# Patient Record
Sex: FEMALE | Race: WHITE | NOT HISPANIC OR LATINO | Employment: OTHER | URBAN - METROPOLITAN AREA
[De-identification: names, ages, dates, MRNs, and addresses within clinical notes are randomized per-mention and may not be internally consistent; named-entity substitution may affect disease eponyms.]

---

## 2017-10-25 ENCOUNTER — ESTABLISHED COMPREHENSIVE EXAM (OUTPATIENT)
Dept: URBAN - METROPOLITAN AREA CLINIC 43 | Facility: CLINIC | Age: 72
End: 2017-10-25

## 2017-10-25 DIAGNOSIS — H00.026: ICD-10-CM

## 2017-10-25 DIAGNOSIS — H04.123: ICD-10-CM

## 2017-10-25 DIAGNOSIS — H00.023: ICD-10-CM

## 2017-10-25 DIAGNOSIS — H35.371: ICD-10-CM

## 2017-10-25 PROCEDURE — G8785 BP SCRN NO PERF AT INTERVAL: HCPCS

## 2017-10-25 PROCEDURE — 1036F TOBACCO NON-USER: CPT

## 2017-10-25 PROCEDURE — G8427 DOCREV CUR MEDS BY ELIG CLIN: HCPCS

## 2017-10-25 PROCEDURE — 92015 DETERMINE REFRACTIVE STATE: CPT

## 2017-10-25 PROCEDURE — 92014 COMPRE OPH EXAM EST PT 1/>: CPT

## 2017-10-25 ASSESSMENT — VISUAL ACUITY
OD_SC: J2
OS_SC: J1
OS_SC: 20/25-2
OD_SC: 20/30-1

## 2017-10-25 ASSESSMENT — TONOMETRY
OD_IOP_MMHG: 16
OS_IOP_MMHG: 17

## 2018-10-24 ENCOUNTER — ESTABLISHED COMPREHENSIVE EXAM (OUTPATIENT)
Dept: URBAN - METROPOLITAN AREA CLINIC 43 | Facility: CLINIC | Age: 73
End: 2018-10-24

## 2018-10-24 DIAGNOSIS — H02.884: ICD-10-CM

## 2018-10-24 DIAGNOSIS — H35.371: ICD-10-CM

## 2018-10-24 DIAGNOSIS — H04.123: ICD-10-CM

## 2018-10-24 DIAGNOSIS — Z96.1: ICD-10-CM

## 2018-10-24 DIAGNOSIS — H02.881: ICD-10-CM

## 2018-10-24 PROCEDURE — G9903 PT SCRN TBCO ID AS NON USER: HCPCS

## 2018-10-24 PROCEDURE — G8785 BP SCRN NO PERF AT INTERVAL: HCPCS

## 2018-10-24 PROCEDURE — G8427 DOCREV CUR MEDS BY ELIG CLIN: HCPCS

## 2018-10-24 PROCEDURE — 92014 COMPRE OPH EXAM EST PT 1/>: CPT

## 2018-10-24 PROCEDURE — 1036F TOBACCO NON-USER: CPT

## 2018-10-24 PROCEDURE — 92015 DETERMINE REFRACTIVE STATE: CPT

## 2018-10-24 ASSESSMENT — TONOMETRY
OD_IOP_MMHG: 15
OS_IOP_MMHG: 16

## 2018-10-24 ASSESSMENT — VISUAL ACUITY
OD_SC: 20/30+2
OS_SC: J1
OD_SC: J1
OS_SC: 20/40+2

## 2019-07-24 ENCOUNTER — EST. PATIENT EMERGENCY (OUTPATIENT)
Dept: URBAN - METROPOLITAN AREA CLINIC 43 | Facility: CLINIC | Age: 74
End: 2019-07-24

## 2019-07-24 DIAGNOSIS — H02.884: ICD-10-CM

## 2019-07-24 DIAGNOSIS — H02.881: ICD-10-CM

## 2019-07-24 PROCEDURE — 92012 INTRM OPH EXAM EST PATIENT: CPT

## 2019-07-24 RX ORDER — LOTEPREDNOL ETABONATE AND TOBRAMYCIN 5; 3 MG/ML; MG/ML: 1 SUSPENSION/ DROPS OPHTHALMIC

## 2019-07-24 ASSESSMENT — VISUAL ACUITY
OD_SC: 20/30-1
OS_SC: 20/30+2

## 2019-07-24 ASSESSMENT — TONOMETRY
OD_IOP_MMHG: 16
OS_IOP_MMHG: 16

## 2019-10-25 ENCOUNTER — ESTABLISHED COMPREHENSIVE EXAM (OUTPATIENT)
Dept: URBAN - METROPOLITAN AREA CLINIC 43 | Facility: CLINIC | Age: 74
End: 2019-10-25

## 2019-10-25 DIAGNOSIS — H35.371: ICD-10-CM

## 2019-10-25 DIAGNOSIS — H04.123: ICD-10-CM

## 2019-10-25 PROCEDURE — 92134 CPTRZ OPH DX IMG PST SGM RTA: CPT

## 2019-10-25 PROCEDURE — 92014 COMPRE OPH EXAM EST PT 1/>: CPT

## 2019-10-25 PROCEDURE — 92015 DETERMINE REFRACTIVE STATE: CPT

## 2019-10-25 ASSESSMENT — VISUAL ACUITY
OD_SC: J1
OS_SC: J1
OD_SC: 20/30
OS_SC: 20/30-1

## 2019-10-25 ASSESSMENT — TONOMETRY
OS_IOP_MMHG: 16
OD_IOP_MMHG: 16

## 2020-03-29 NOTE — PATIENT DISCUSSION
[facility]   Daily Progress Note    Admit Date:  3/20/2020  HPI:   Presented with hypotension and weight gain. Found to have SHAUNNA on CKD as well as anasarca. Subjective:  Ms. Evelio Ramsay still with edema, weight down. No lightheadedness or cramping. Objective:   /77   Pulse 87   Temp 97.8 °F (36.6 °C) (Oral)   Resp 18   Ht 5' 4.5\" (1.638 m)   Wt 262 lb 14.4 oz (119.3 kg)   LMP 10/24/2012   SpO2 97%   BMI 44.43 kg/m²       Intake/Output Summary (Last 24 hours) at 3/29/2020 0950  Last data filed at 3/29/2020 0852  Gross per 24 hour   Intake 900 ml   Output 3700 ml   Net -2800 ml     Wt Readings from Last 3 Encounters:   03/29/20 262 lb 14.4 oz (119.3 kg)   03/20/20 269 lb (122 kg)   03/17/20 257 lb (116.6 kg)           ASSESSMENT:   1. Hypotension - improved on midodrine 10 mg tid  2. CHF, acute on chronic systolic - still volume up with anasarca, weight down 5 lbs overnight/ 14 lbs total , 3700 ml UOP/ 24 hrs, net neg 20 liters  3. Cardiomyopathy, ischemic - EF 25%, no ACEi/ ARB/ ARNI or BB due to hypotension and SHAUNNA/ CKD  4. CAD - s/p prior PCI's then CABG Jan 2020  5. Acute kidney injury on chronic kidney disease - stable with diuresis  6. Hypokalemia - 3.3, continue replacement  7. Mitral regurg  8. Sternal wound infection - augmentin per CT surgery  9. CLINT  10. DM      PLAN:  1. Continue Lasix infusion at 5 mg/hr  2. Monitor K and replace   3. Continue midodrine 10 mg tid  4. Likely here at least another 2 days.        TRAY Curry - CNP, 3/29/2020, 9:50 AM  [facility]   [phone]       Telemetry: SR 80-90  NYHA: IV    Physical Exam:  General:  Awake, alert, NAD  Skin:  Warm and dry  Neck:  JVP just below jaw line upright  Chest:  Clear to auscultation   Cardiovascular:  RRR, normal S1S2, no m/g/r  Abdomen:  Semifirm, + pitting lower and lateral abdomen, nontender, +bowel sounds  Extremities:  2+ BLE edema        Medications:    potassium chloride  40 mEq Oral Smoking cessation discussed. BID WC    miconazole   Topical BID    polyethylene glycol  17 g Oral Daily    ARIPiprazole  10 mg Oral Daily    aspirin EC  81 mg Oral Daily    atorvastatin  80 mg Oral Nightly    benztropine  1 mg Oral Nightly    buprenorphine-naloxone  1 Film Sublingual BID    busPIRone  30 mg Oral BID    clopidogrel  75 mg Oral Daily    lamoTRIgine  150 mg Oral BID    pantoprazole  40 mg Oral BID    tiotropium  2 puff Inhalation Daily    sodium chloride flush  10 mL Intravenous 2 times per day    insulin lispro  8 Units Subcutaneous TID WC    insulin lispro  0-12 Units Subcutaneous TID WC    insulin lispro  0-6 Units Subcutaneous Nightly    heparin (porcine)  5,000 Units Subcutaneous 3 times per day    midodrine  10 mg Oral TID WC      furosemide (LASIX) 1mg/ml infusion 5 mg/hr (03/28/20 2051)    dextrose         Lab Data: Lab results independently reviewed by myself 3/27/20   CBC:   Recent Labs     03/27/20  0436 03/28/20  0451 03/29/20  0450   WBC 9.0 7.9 7.3   HGB 9.0* 9.1* 9.2*    320 332     BMP:    Recent Labs     03/27/20  0436 03/27/20  1321 03/28/20  0451 03/29/20  0450   *  --  135* 138   K 2.9* 2.9* 3.9 3.3*   CO2 28  --  27 28   BUN 47*  --  43* 42*   CREATININE 1.4*  --  1.4* 1.4*     INR:  No results for input(s): INR in the last 72 hours. BNP:    Recent Labs     03/28/20  0907   PROBNP 3,052*     Cardiac Enzymes: No results for input(s): TROPONINI in the last 72 hours. Lipids:   Lab Results   Component Value Date    TRIG 78 01/27/2020    TRIG 140 01/11/2020    HDL 21 01/27/2020    HDL 27 01/11/2020    LDLCALC 21 01/27/2020    LDLCALC 103 01/11/2020    LDLDIRECT 187 08/01/2017       Cardiac Imaging:      Surgery: 1/27/20 Urgent coronary artery bypass grafting surgery x1 with pedicled left internal mammary artery to the LAD. Cardiopulmonary bypass with on-pump beating-heart technique, no cross-clamp. Transesophageal echo. Epiaortic ultrasound. [place]-Jur[place] catheter placement.  Doppler verification of grafts. Bilateral five-level intercostal nerve block with Exparel. Platelet gel application. Sternal plating. Vas-Cath placement. URI 1/24/2020:  Ejection fraction is visually estimated at 35-40%.   Moderate to severe mitral regurgitation with multiple jets visualized.   ERO estimated at 0.29 cm2.   Mild tricuspid regurgitation.   The left atrial appendage shows evidence of thrombus formation and low flow velocities. Moderate amount of plaque in the aorta.     Echo 1/22/2020:   Technically difficult examination secondary to habitus.   The left ventricular systolic function is severely reduced with an ejection fraction of 25 %.   There is severe hypokinesis of the anterior and apical walls consistent with infarction within the territory of the left anterior descending artery.   Grade II diastolic dysfunction with elevated left ventricular filling pressure.   Moderate to severe mitral regurgitation.   Mild tricuspid regurgitation. PASP is estimated at 53 mmHg consistent with moderate pulmonary hypertension  (Right atrial pressure of 3 mmHg).   If clinically necessary, consider URI for better evaluation of mitral    CARDIAC CATH 1/20/2020:   Left Heart Cath  Dominance : Right   LM: 70-80% short distal stenosis   LAD: 70-80% short ostial stenosis, extending into takeoff of high first diagonal; large proximal to mid stented segment patent with jailed second diagonal  (80% ostial D2) and 70% in stent restenosis of most distal stent; distal to near apical LAD with minimal disease   LCx: 70-80% short ostial stenosis, prior to patent proximal to mid stents    RCA: large, dominant vessel with long 60% proximal to mid stenosis    LVEDP: 4 mmHG  LVG not done due to CKD.     Impression/Recommendations:  Diabetic with previous LAD and LCx stenting in 2018 returns with unstable angina, in stent restenosis and Left Main bifurcation disease.    Recommend CTS evaluation inpatient to discuss surgical

## 2021-01-05 ENCOUNTER — ESTABLISHED COMPREHENSIVE EXAM (OUTPATIENT)
Dept: URBAN - METROPOLITAN AREA CLINIC 43 | Facility: CLINIC | Age: 76
End: 2021-01-05

## 2021-01-05 DIAGNOSIS — H04.123: ICD-10-CM

## 2021-01-05 DIAGNOSIS — H02.881: ICD-10-CM

## 2021-01-05 DIAGNOSIS — H02.884: ICD-10-CM

## 2021-01-05 DIAGNOSIS — H53.001: ICD-10-CM

## 2021-01-05 DIAGNOSIS — H35.371: ICD-10-CM

## 2021-01-05 PROCEDURE — 92015 DETERMINE REFRACTIVE STATE: CPT

## 2021-01-05 PROCEDURE — 92014 COMPRE OPH EXAM EST PT 1/>: CPT

## 2021-01-05 ASSESSMENT — VISUAL ACUITY
OS_SC: J2
OD_SC: J1
OD_SC: 20/40-2
OS_SC: 20/30-1
OD_PH: 20/30-1

## 2021-01-05 ASSESSMENT — TONOMETRY
OD_IOP_MMHG: 12
OS_IOP_MMHG: 12

## 2021-06-08 ENCOUNTER — EST. PATIENT EMERGENCY (OUTPATIENT)
Dept: URBAN - METROPOLITAN AREA CLINIC 43 | Facility: CLINIC | Age: 76
End: 2021-06-08

## 2021-06-08 DIAGNOSIS — H01.02B: ICD-10-CM

## 2021-06-08 DIAGNOSIS — H01.02A: ICD-10-CM

## 2021-06-08 DIAGNOSIS — H04.123: ICD-10-CM

## 2021-06-08 DIAGNOSIS — H02.884: ICD-10-CM

## 2021-06-08 DIAGNOSIS — H02.881: ICD-10-CM

## 2021-06-08 PROCEDURE — 92012 INTRM OPH EXAM EST PATIENT: CPT

## 2021-06-08 RX ORDER — DOXYCYCLINE 50 MG/1
1 CAPSULE ORAL ONCE A DAY
Start: 2021-06-08 | End: 2021-07-08

## 2021-06-08 RX ORDER — LOTEPREDNOL ETABONATE AND TOBRAMYCIN 5; 3 MG/ML; MG/ML
1 SUSPENSION/ DROPS OPHTHALMIC
Start: 2021-06-08 | End: 2021-06-22

## 2021-06-08 ASSESSMENT — VISUAL ACUITY
OD_SC: 20/40
OS_SC: 20/30-2
OS_SC: J1
OD_SC: J3

## 2021-07-07 ENCOUNTER — FOLLOW UP (OUTPATIENT)
Dept: URBAN - METROPOLITAN AREA CLINIC 43 | Facility: CLINIC | Age: 76
End: 2021-07-07

## 2021-07-07 DIAGNOSIS — H04.123: ICD-10-CM

## 2021-07-07 DIAGNOSIS — H01.02A: ICD-10-CM

## 2021-07-07 DIAGNOSIS — H02.881: ICD-10-CM

## 2021-07-07 DIAGNOSIS — H01.02B: ICD-10-CM

## 2021-07-07 DIAGNOSIS — H02.884: ICD-10-CM

## 2021-07-07 PROCEDURE — 99212 OFFICE O/P EST SF 10 MIN: CPT

## 2021-07-07 RX ORDER — LOTEPREDNOL ETABONATE 2 MG/ML
1 SUSPENSION/ DROPS OPHTHALMIC AS NEEDED
Start: 2021-07-07

## 2021-07-07 ASSESSMENT — TONOMETRY
OD_IOP_MMHG: 16
OS_IOP_MMHG: 18

## 2021-07-07 ASSESSMENT — VISUAL ACUITY
OS_SC: 20/25+2
OD_SC: 20/25

## 2021-12-13 ENCOUNTER — COMPREHENSIVE EXAM (OUTPATIENT)
Dept: URBAN - METROPOLITAN AREA CLINIC 43 | Facility: CLINIC | Age: 76
End: 2021-12-13

## 2021-12-13 DIAGNOSIS — H02.884: ICD-10-CM

## 2021-12-13 DIAGNOSIS — H01.02B: ICD-10-CM

## 2021-12-13 DIAGNOSIS — H01.02A: ICD-10-CM

## 2021-12-13 DIAGNOSIS — H35.373: ICD-10-CM

## 2021-12-13 DIAGNOSIS — H04.123: ICD-10-CM

## 2021-12-13 DIAGNOSIS — H02.881: ICD-10-CM

## 2021-12-13 PROCEDURE — 92015 DETERMINE REFRACTIVE STATE: CPT

## 2021-12-13 PROCEDURE — 92134 CPTRZ OPH DX IMG PST SGM RTA: CPT

## 2021-12-13 PROCEDURE — 92014 COMPRE OPH EXAM EST PT 1/>: CPT

## 2021-12-13 ASSESSMENT — TONOMETRY
OS_IOP_MMHG: 16
OD_IOP_MMHG: 16

## 2021-12-13 ASSESSMENT — VISUAL ACUITY
OS_SC: 20/30-2
OD_CC: J1
OS_CC: J1
OS_SC: J1
OD_SC: J2
OD_SC: 20/40+1

## 2022-07-06 ENCOUNTER — COMPREHENSIVE EXAM (OUTPATIENT)
Dept: URBAN - METROPOLITAN AREA CLINIC 43 | Facility: CLINIC | Age: 77
End: 2022-07-06

## 2022-07-06 DIAGNOSIS — H52.4: ICD-10-CM

## 2022-07-06 DIAGNOSIS — H02.834: ICD-10-CM

## 2022-07-06 DIAGNOSIS — H35.373: ICD-10-CM

## 2022-07-06 DIAGNOSIS — H02.831: ICD-10-CM

## 2022-07-06 DIAGNOSIS — H04.123: ICD-10-CM

## 2022-07-06 PROCEDURE — 92014 COMPRE OPH EXAM EST PT 1/>: CPT

## 2022-07-06 PROCEDURE — 92015 DETERMINE REFRACTIVE STATE: CPT

## 2022-07-06 PROCEDURE — 92134 CPTRZ OPH DX IMG PST SGM RTA: CPT

## 2022-07-06 ASSESSMENT — VISUAL ACUITY
OS_SC: 20/30-2
OD_SC: 20/50-2
OD_SC: J4
OS_SC: J2

## 2022-07-06 ASSESSMENT — TONOMETRY
OS_IOP_MMHG: 15
OD_IOP_MMHG: 16

## 2023-01-20 ENCOUNTER — APPOINTMENT (RX ONLY)
Dept: URBAN - METROPOLITAN AREA CLINIC 137 | Facility: CLINIC | Age: 78
Setting detail: DERMATOLOGY
End: 2023-01-20

## 2023-01-20 DIAGNOSIS — L20.89 OTHER ATOPIC DERMATITIS: ICD-10-CM

## 2023-01-20 DIAGNOSIS — L30.4 ERYTHEMA INTERTRIGO: ICD-10-CM | Status: WELL CONTROLLED

## 2023-01-20 DIAGNOSIS — D49.2 NEOPLASM OF UNSPECIFIED BEHAVIOR OF BONE, SOFT TISSUE, AND SKIN: ICD-10-CM

## 2023-01-20 DIAGNOSIS — K13.0 DISEASES OF LIPS: ICD-10-CM | Status: STABLE

## 2023-01-20 DIAGNOSIS — D69.2 OTHER NONTHROMBOCYTOPENIC PURPURA: ICD-10-CM

## 2023-01-20 DIAGNOSIS — L57.0 ACTINIC KERATOSIS: ICD-10-CM

## 2023-01-20 DIAGNOSIS — Z71.89 OTHER SPECIFIED COUNSELING: ICD-10-CM

## 2023-01-20 PROCEDURE — 99214 OFFICE O/P EST MOD 30 MIN: CPT | Mod: 25

## 2023-01-20 PROCEDURE — ? PRESCRIPTION

## 2023-01-20 PROCEDURE — ? LIQUID NITROGEN

## 2023-01-20 PROCEDURE — 11102 TANGNTL BX SKIN SINGLE LES: CPT

## 2023-01-20 PROCEDURE — ? SUNSCREEN RECOMMENDATIONS

## 2023-01-20 PROCEDURE — ? PRESCRIPTION MEDICATION MANAGEMENT

## 2023-01-20 PROCEDURE — ? COUNSELING

## 2023-01-20 PROCEDURE — ? BIOPSY BY SHAVE METHOD

## 2023-01-20 PROCEDURE — 17003 DESTRUCT PREMALG LES 2-14: CPT

## 2023-01-20 PROCEDURE — 17000 DESTRUCT PREMALG LESION: CPT | Mod: 59

## 2023-01-20 RX ORDER — NYSTATIN 100000 [USP'U]/G
OINTMENT TOPICAL
Qty: 15 | Refills: 0 | Status: ACTIVE

## 2023-01-20 ASSESSMENT — LOCATION DETAILED DESCRIPTION DERM
LOCATION DETAILED: NASAL DORSUM
LOCATION DETAILED: LEFT MEDIAL MALAR CHEEK
LOCATION DETAILED: LEFT DISTAL POSTERIOR UPPER ARM
LOCATION DETAILED: RIGHT NASAL DORSUM
LOCATION DETAILED: LEFT ANTERIOR DISTAL UPPER ARM

## 2023-01-20 ASSESSMENT — LOCATION SIMPLE DESCRIPTION DERM
LOCATION SIMPLE: LEFT POSTERIOR UPPER ARM
LOCATION SIMPLE: LEFT CHEEK
LOCATION SIMPLE: LEFT UPPER ARM
LOCATION SIMPLE: NOSE

## 2023-01-20 ASSESSMENT — LOCATION ZONE DERM
LOCATION ZONE: NOSE
LOCATION ZONE: FACE
LOCATION ZONE: ARM

## 2023-01-20 NOTE — PROCEDURE: COUNSELING
Detail Level: Detailed
Patient Specific Counseling (Will Not Stick From Patient To Patient): \\nPlan to use Dermaka cream twice a day with new purpura

## 2023-01-20 NOTE — PROCEDURE: BIOPSY BY SHAVE METHOD
Detail Level: Detailed
Depth Of Biopsy: dermis
Was A Bandage Applied: Yes
Size Of Lesion In Cm: 0
Biopsy Type: H and E
Biopsy Method: Dermablade
Anesthesia Type: 1% lidocaine with epinephrine
Anesthesia Volume In Cc (Will Not Render If 0): 0.5
Hemostasis: Electrocautery
Wound Care: Aquaphor
Dressing: bandage
Destruction After The Procedure: No
Type Of Destruction Used: Curettage
Curettage Text: The wound bed was treated with curettage after the biopsy was performed.
Cryotherapy Text: The wound bed was treated with cryotherapy after the biopsy was performed.
Electrodesiccation Text: The wound bed was treated with electrodesiccation after the biopsy was performed.
Electrodesiccation And Curettage Text: The wound bed was treated with electrodesiccation and curettage after the biopsy was performed.
Silver Nitrate Text: The wound bed was treated with silver nitrate after the biopsy was performed.
Lab: -[id number]
Consent: Written consent was obtained and risks were reviewed including but not limited to scarring, infection, bleeding, scabbing, incomplete removal, nerve damage and allergy to anesthesia.
Post-Care Instructions: I reviewed with the patient in detail post-care instructions. Patient is to keep the biopsy site dry overnight, and then apply THIN layer of Aquaphor once daily with bandage  and change daily until healed.  Patient to call office for any problems ( pain or bleeding or swelling)
Notification Instructions: Patient will be notified of biopsy results. However, patient instructed to call the office if not contacted within 2 weeks.
Billing Type: [employer]
Information: Selecting Yes will display possible errors in your note based on the variables you have selected. This validation is only offered as a suggestion for you. PLEASE NOTE THAT THE VALIDATION TEXT WILL BE REMOVED WHEN YOU FINALIZE YOUR NOTE. IF YOU WANT TO FAX A PRELIMINARY NOTE YOU WILL NEED TO TOGGLE THIS TO 'NO' IF YOU DO NOT WANT IT IN YOUR FAXED NOTE.

## 2023-01-20 NOTE — PROCEDURE: PRESCRIPTION MEDICATION MANAGEMENT
Detail Level: Zone
Render In Strict Bullet Format?: No
Continue Regimen: Betamethasone cream to underarms as needed.
Initiate Treatment: Nystatin ointment to corners of mouth at night.
Continue Regimen: nyastatin CREAM bid when needed.

## 2023-01-25 ENCOUNTER — RX ONLY (OUTPATIENT)
Age: 78
Setting detail: RX ONLY
End: 2023-01-25

## 2023-01-25 RX ORDER — CRISABOROLE 20 MG/G
OINTMENT TOPICAL
Qty: 1 | Refills: 1 | Status: ERX | COMMUNITY
Start: 2023-01-25

## 2023-01-25 RX ORDER — NYSTATIN CREAM 100000 [USP'U]/G
CREAM TOPICAL
Qty: 30 | Refills: 1 | Status: ERX | COMMUNITY
Start: 2023-01-25

## 2023-07-10 ENCOUNTER — COMPREHENSIVE EXAM (OUTPATIENT)
Dept: URBAN - METROPOLITAN AREA CLINIC 43 | Facility: CLINIC | Age: 78
End: 2023-07-10

## 2023-07-10 DIAGNOSIS — H02.881: ICD-10-CM

## 2023-07-10 DIAGNOSIS — H04.123: ICD-10-CM

## 2023-07-10 DIAGNOSIS — H02.831: ICD-10-CM

## 2023-07-10 DIAGNOSIS — Z79.899: ICD-10-CM

## 2023-07-10 DIAGNOSIS — H52.4: ICD-10-CM

## 2023-07-10 DIAGNOSIS — H02.834: ICD-10-CM

## 2023-07-10 DIAGNOSIS — H02.884: ICD-10-CM

## 2023-07-10 DIAGNOSIS — H01.02A: ICD-10-CM

## 2023-07-10 DIAGNOSIS — H01.02B: ICD-10-CM

## 2023-07-10 DIAGNOSIS — H35.373: ICD-10-CM

## 2023-07-10 PROCEDURE — 92134 CPTRZ OPH DX IMG PST SGM RTA: CPT

## 2023-07-10 PROCEDURE — 92015 DETERMINE REFRACTIVE STATE: CPT

## 2023-07-10 PROCEDURE — 92014 COMPRE OPH EXAM EST PT 1/>: CPT

## 2023-07-10 ASSESSMENT — VISUAL ACUITY
OD_SC: 20/30
OD_SC: J2
OS_SC: 20/30+2
OS_SC: J1-

## 2023-07-10 ASSESSMENT — TONOMETRY
OS_IOP_MMHG: 11
OD_IOP_MMHG: 12

## 2023-09-08 ENCOUNTER — APPOINTMENT (RX ONLY)
Dept: URBAN - METROPOLITAN AREA CLINIC 137 | Facility: CLINIC | Age: 78
Setting detail: DERMATOLOGY
End: 2023-09-08

## 2023-09-08 DIAGNOSIS — T07XXXA ABRASION OR FRICTION BURN OF OTHER, MULTIPLE, AND UNSPECIFIED SITES, WITHOUT MENTION OF INFECTION: ICD-10-CM

## 2023-09-08 DIAGNOSIS — S31000A OPEN WOUND(S) (MULTIPLE) OF UNSPECIFIED SITE(S), WITHOUT MENTION OF COMPLICATION: ICD-10-CM

## 2023-09-08 DIAGNOSIS — I87.2 VENOUS INSUFFICIENCY (CHRONIC) (PERIPHERAL): ICD-10-CM

## 2023-09-08 DIAGNOSIS — D49.2 NEOPLASM OF UNSPECIFIED BEHAVIOR OF BONE, SOFT TISSUE, AND SKIN: ICD-10-CM

## 2023-09-08 PROBLEM — S81.812A LACERATION WITHOUT FOREIGN BODY, LEFT LOWER LEG, INITIAL ENCOUNTER: Status: ACTIVE | Noted: 2023-09-08

## 2023-09-08 PROBLEM — S50.312A ABRASION OF LEFT ELBOW, INITIAL ENCOUNTER: Status: ACTIVE | Noted: 2023-09-08

## 2023-09-08 PROCEDURE — ? ADDITIONAL NOTES

## 2023-09-08 PROCEDURE — ? COUNSELING

## 2023-09-08 PROCEDURE — ? PRESCRIPTION

## 2023-09-08 PROCEDURE — ? BIOPSY BY SHAVE METHOD

## 2023-09-08 PROCEDURE — 11102 TANGNTL BX SKIN SINGLE LES: CPT

## 2023-09-08 PROCEDURE — 99213 OFFICE O/P EST LOW 20 MIN: CPT | Mod: 25

## 2023-09-08 RX ORDER — MUPIROCIN 20 MG/G
OINTMENT TOPICAL
Qty: 22 | Refills: 0 | Status: ERX | COMMUNITY
Start: 2023-09-08

## 2023-09-08 RX ADMIN — MUPIROCIN 1: 20 OINTMENT TOPICAL at 00:00

## 2023-09-08 ASSESSMENT — LOCATION DETAILED DESCRIPTION DERM
LOCATION DETAILED: LEFT ELBOW
LOCATION DETAILED: LEFT DISTAL PRETIBIAL REGION
LOCATION DETAILED: RIGHT INFERIOR MEDIAL MALAR CHEEK
LOCATION DETAILED: RIGHT DISTAL PRETIBIAL REGION

## 2023-09-08 ASSESSMENT — LOCATION SIMPLE DESCRIPTION DERM
LOCATION SIMPLE: RIGHT CHEEK
LOCATION SIMPLE: RIGHT PRETIBIAL REGION
LOCATION SIMPLE: LEFT PRETIBIAL REGION
LOCATION SIMPLE: LEFT ELBOW

## 2023-09-08 ASSESSMENT — LOCATION ZONE DERM
LOCATION ZONE: LEG
LOCATION ZONE: ARM
LOCATION ZONE: FACE

## 2023-09-08 NOTE — PROCEDURE: BIOPSY BY SHAVE METHOD
Detail Level: Detailed
Depth Of Biopsy: dermis
Was A Bandage Applied: Yes
Size Of Lesion In Cm: 0
Biopsy Type: H and E
Biopsy Method: Dermablade
Anesthesia Type: 1% lidocaine with epinephrine
Anesthesia Volume In Cc (Will Not Render If 0): 0.5
Hemostasis: Electrocautery
Wound Care: Aquaphor
Dressing: bandage
Destruction After The Procedure: No
Type Of Destruction Used: Curettage
Curettage Text: The wound bed was treated with curettage after the biopsy was performed.
Cryotherapy Text: The wound bed was treated with cryotherapy after the biopsy was performed.
Electrodesiccation Text: The wound bed was treated with electrodesiccation after the biopsy was performed.
Electrodesiccation And Curettage Text: The wound bed was treated with electrodesiccation and curettage after the biopsy was performed.
Silver Nitrate Text: The wound bed was treated with silver nitrate after the biopsy was performed.
Lab: -[id number]
Consent: Written consent was obtained and risks were reviewed including but not limited to scarring, infection, bleeding, scabbing, incomplete removal, nerve damage and allergy to anesthesia.
Post-Care Instructions: I reviewed with the patient in detail post-care instructions. Patient is to keep the biopsy site dry overnight, and then apply THIN layer of Aquaphor once daily with bandage  and change daily until healed.  Patient to call office for any problems ( pain or bleeding or swelling)
Notification Instructions: Patient will be notified of biopsy results. However, patient instructed to call the office if not contacted within 2 weeks.
Billing Type: [employer]
Information: Selecting Yes will display possible errors in your note based on the variables you have selected. This validation is only offered as a suggestion for you. PLEASE NOTE THAT THE VALIDATION TEXT WILL BE REMOVED WHEN YOU FINALIZE YOUR NOTE. IF YOU WANT TO FAX A PRELIMINARY NOTE YOU WILL NEED TO TOGGLE THIS TO 'NO' IF YOU DO NOT WANT IT IN YOUR FAXED NOTE.

## 2023-09-08 NOTE — PROCEDURE: ADDITIONAL NOTES
Detail Level: Zone
Render Risk Assessment In Note?: no
Additional Notes: Patient seeing podiatrist for wound care.
Additional Notes: Patient being followed by vascular doctor. Patient had clot in groin while hospitalized.

## 2023-09-18 ENCOUNTER — FOLLOW UP (OUTPATIENT)
Dept: URBAN - METROPOLITAN AREA CLINIC 43 | Facility: CLINIC | Age: 78
End: 2023-09-18

## 2023-09-18 DIAGNOSIS — H02.831: ICD-10-CM

## 2023-09-18 DIAGNOSIS — H02.834: ICD-10-CM

## 2023-09-18 DIAGNOSIS — H35.373: ICD-10-CM

## 2023-09-18 DIAGNOSIS — H04.123: ICD-10-CM

## 2023-09-18 DIAGNOSIS — Z79.899: ICD-10-CM

## 2023-09-18 PROCEDURE — 92012 INTRM OPH EXAM EST PATIENT: CPT

## 2023-09-18 PROCEDURE — 92083 EXTENDED VISUAL FIELD XM: CPT

## 2023-09-18 ASSESSMENT — TONOMETRY
OD_IOP_MMHG: 12
OS_IOP_MMHG: 12

## 2023-09-18 ASSESSMENT — VISUAL ACUITY
OS_SC: 20/30
OD_SC: 20/30-2

## 2023-09-25 ENCOUNTER — ESTABLISHED PATIENT (OUTPATIENT)
Dept: URBAN - METROPOLITAN AREA CLINIC 44 | Facility: CLINIC | Age: 78
End: 2023-09-25

## 2023-09-25 VITALS — BODY MASS INDEX: 23.49 KG/M2 | HEIGHT: 65 IN | WEIGHT: 141 LBS

## 2023-09-25 DIAGNOSIS — H04.123: ICD-10-CM

## 2023-09-25 DIAGNOSIS — H02.835: ICD-10-CM

## 2023-09-25 DIAGNOSIS — L98.8: ICD-10-CM

## 2023-09-25 DIAGNOSIS — H02.832: ICD-10-CM

## 2023-09-25 DIAGNOSIS — H02.834: ICD-10-CM

## 2023-09-25 DIAGNOSIS — H02.831: ICD-10-CM

## 2023-09-25 PROCEDURE — 92285 EXTERNAL OCULAR PHOTOGRAPHY: CPT

## 2023-09-25 PROCEDURE — 99214 OFFICE O/P EST MOD 30 MIN: CPT

## 2023-09-25 ASSESSMENT — VISUAL ACUITY
OS_SC: 20/30
OD_SC: 20/30-2

## 2023-09-28 ENCOUNTER — TECH ONLY (OUTPATIENT)
Dept: URBAN - METROPOLITAN AREA CLINIC 43 | Facility: CLINIC | Age: 78
End: 2023-09-28

## 2023-09-28 DIAGNOSIS — H02.835: ICD-10-CM

## 2023-09-28 DIAGNOSIS — H02.831: ICD-10-CM

## 2023-09-28 DIAGNOSIS — H02.834: ICD-10-CM

## 2023-09-28 DIAGNOSIS — H02.832: ICD-10-CM

## 2023-09-28 PROCEDURE — 99211T TECH SERVICE

## 2023-09-28 PROCEDURE — 92081 LIMITED VISUAL FIELD XM: CPT

## 2024-05-13 ENCOUNTER — APPOINTMENT (RX ONLY)
Dept: URBAN - METROPOLITAN AREA CLINIC 137 | Facility: CLINIC | Age: 79
Setting detail: DERMATOLOGY
End: 2024-05-13

## 2024-05-13 DIAGNOSIS — L20.89 OTHER ATOPIC DERMATITIS: ICD-10-CM

## 2024-05-13 DIAGNOSIS — I87.2 VENOUS INSUFFICIENCY (CHRONIC) (PERIPHERAL): ICD-10-CM

## 2024-05-13 DIAGNOSIS — B35.1 TINEA UNGUIUM: ICD-10-CM

## 2024-05-13 DIAGNOSIS — L98.8 OTHER SPECIFIED DISORDERS OF THE SKIN AND SUBCUTANEOUS TISSUE: ICD-10-CM

## 2024-05-13 DIAGNOSIS — D18.0 HEMANGIOMA: ICD-10-CM | Status: UNCHANGED

## 2024-05-13 DIAGNOSIS — Z71.89 OTHER SPECIFIED COUNSELING: ICD-10-CM

## 2024-05-13 DIAGNOSIS — L82.0 INFLAMED SEBORRHEIC KERATOSIS: ICD-10-CM

## 2024-05-13 DIAGNOSIS — D69.2 OTHER NONTHROMBOCYTOPENIC PURPURA: ICD-10-CM

## 2024-05-13 DIAGNOSIS — L57.8 OTHER SKIN CHANGES DUE TO CHRONIC EXPOSURE TO NONIONIZING RADIATION: ICD-10-CM | Status: UNCHANGED

## 2024-05-13 DIAGNOSIS — L82.1 OTHER SEBORRHEIC KERATOSIS: ICD-10-CM | Status: UNCHANGED

## 2024-05-13 PROBLEM — D18.01 HEMANGIOMA OF SKIN AND SUBCUTANEOUS TISSUE: Status: ACTIVE | Noted: 2024-05-13

## 2024-05-13 PROCEDURE — ? SUNSCREEN RECOMMENDATIONS

## 2024-05-13 PROCEDURE — ? LIQUID NITROGEN

## 2024-05-13 PROCEDURE — ? ADDITIONAL NOTES

## 2024-05-13 PROCEDURE — ? COUNSELING

## 2024-05-13 PROCEDURE — 17110 DESTRUCTION B9 LES UP TO 14: CPT

## 2024-05-13 PROCEDURE — 99214 OFFICE O/P EST MOD 30 MIN: CPT | Mod: 25

## 2024-05-13 PROCEDURE — ? PRESCRIPTION MEDICATION MANAGEMENT

## 2024-05-13 ASSESSMENT — LOCATION DETAILED DESCRIPTION DERM
LOCATION DETAILED: LEFT MEDIAL SUPERIOR CHEST
LOCATION DETAILED: RIGHT DISTAL PRETIBIAL REGION
LOCATION DETAILED: RIGHT CENTRAL MALAR CHEEK
LOCATION DETAILED: PERIUMBILICAL SKIN
LOCATION DETAILED: RIGHT SUPERIOR UPPER BACK
LOCATION DETAILED: LEFT DISTAL PRETIBIAL REGION
LOCATION DETAILED: RIGHT GREAT TOENAIL
LOCATION DETAILED: RIGHT ANTERIOR PROXIMAL THIGH
LOCATION DETAILED: LEFT ANTERIOR DISTAL UPPER ARM
LOCATION DETAILED: LEFT DISTAL POSTERIOR UPPER ARM
LOCATION DETAILED: INFERIOR THORACIC SPINE
LOCATION DETAILED: LEFT INFERIOR VERMILION LIP

## 2024-05-13 ASSESSMENT — LOCATION SIMPLE DESCRIPTION DERM
LOCATION SIMPLE: LEFT PRETIBIAL REGION
LOCATION SIMPLE: LEFT LIP
LOCATION SIMPLE: ABDOMEN
LOCATION SIMPLE: RIGHT THIGH
LOCATION SIMPLE: RIGHT CHEEK
LOCATION SIMPLE: LEFT POSTERIOR UPPER ARM
LOCATION SIMPLE: RIGHT GREAT TOE
LOCATION SIMPLE: CHEST
LOCATION SIMPLE: RIGHT UPPER BACK
LOCATION SIMPLE: RIGHT PRETIBIAL REGION
LOCATION SIMPLE: UPPER BACK
LOCATION SIMPLE: LEFT UPPER ARM

## 2024-05-13 ASSESSMENT — LOCATION ZONE DERM
LOCATION ZONE: TRUNK
LOCATION ZONE: ARM
LOCATION ZONE: LIP
LOCATION ZONE: FACE
LOCATION ZONE: LEG
LOCATION ZONE: TOENAIL

## 2024-05-13 NOTE — HPI: PREVENTATIVE SKIN CHECK
What Is The Reason For Today's Visit?: Full Body Skin Examination
Additional History: Pt has concerns on legs, lip and left side of nose.

## 2024-05-13 NOTE — PROCEDURE: PRESCRIPTION MEDICATION MANAGEMENT
Render In Strict Bullet Format?: No
Detail Level: Zone
Continue Regimen: Betamethasone cream to arms as needed.  Apply Tacrolimus ointment 0.1% to face.
Continue Regimen: Tretinoin cream 0.1% apply HS

## 2024-05-13 NOTE — PROCEDURE: COUNSELING
Detail Level: Zone
Detail Level: Generalized
Detail Level: Detailed
Patient Specific Counseling (Will Not Stick From Patient To Patient): \\nPlan to use Dermaka cream twice a day with new purpura

## 2024-05-13 NOTE — PROCEDURE: ADDITIONAL NOTES
Render Risk Assessment In Note?: no
Detail Level: Zone
Additional Notes: Patient being followed by vascular doctor.  Patient had clot in groin while hospitalized. refer pt back to dr. Lemons

## 2024-06-04 ENCOUNTER — COMPREHENSIVE EXAM (OUTPATIENT)
Dept: URBAN - METROPOLITAN AREA CLINIC 43 | Facility: CLINIC | Age: 79
End: 2024-06-04

## 2024-06-04 DIAGNOSIS — H52.4: ICD-10-CM

## 2024-06-04 DIAGNOSIS — H35.373: ICD-10-CM

## 2024-06-04 DIAGNOSIS — H01.02A: ICD-10-CM

## 2024-06-04 DIAGNOSIS — Z79.899: ICD-10-CM

## 2024-06-04 DIAGNOSIS — H04.123: ICD-10-CM

## 2024-06-04 DIAGNOSIS — H02.881: ICD-10-CM

## 2024-06-04 DIAGNOSIS — H01.02B: ICD-10-CM

## 2024-06-04 DIAGNOSIS — H02.884: ICD-10-CM

## 2024-06-04 PROCEDURE — 92015 DETERMINE REFRACTIVE STATE: CPT

## 2024-06-04 PROCEDURE — 92134 CPTRZ OPH DX IMG PST SGM RTA: CPT

## 2024-06-04 PROCEDURE — 92014 COMPRE OPH EXAM EST PT 1/>: CPT | Mod: 25

## 2024-06-04 ASSESSMENT — VISUAL ACUITY
OS_SC: J1-
OS_SC: 20/25-2
OD_SC: 20/25-2
OD_SC: J1

## 2024-06-04 ASSESSMENT — TONOMETRY
OD_IOP_MMHG: 12
OS_IOP_MMHG: 12

## 2024-09-17 ENCOUNTER — RX ONLY (OUTPATIENT)
Age: 79
Setting detail: RX ONLY
End: 2024-09-17

## 2024-09-17 RX ORDER — TACROLIMUS 1 MG/G
OINTMENT TOPICAL
Qty: 60 | Refills: 1 | Status: ERX

## 2025-01-24 ENCOUNTER — APPOINTMENT (OUTPATIENT)
Dept: URBAN - METROPOLITAN AREA CLINIC 137 | Facility: CLINIC | Age: 80
Setting detail: DERMATOLOGY
End: 2025-01-24

## 2025-01-24 DIAGNOSIS — L20.89 OTHER ATOPIC DERMATITIS: ICD-10-CM

## 2025-01-24 DIAGNOSIS — I78.8 OTHER DISEASES OF CAPILLARIES: ICD-10-CM

## 2025-01-24 DIAGNOSIS — L65.9 NONSCARRING HAIR LOSS, UNSPECIFIED: ICD-10-CM

## 2025-01-24 PROCEDURE — ? PRESCRIPTION MEDICATION MANAGEMENT

## 2025-01-24 PROCEDURE — 99213 OFFICE O/P EST LOW 20 MIN: CPT

## 2025-01-24 PROCEDURE — ? PRESCRIPTION

## 2025-01-24 PROCEDURE — ? ADDITIONAL NOTES

## 2025-01-24 PROCEDURE — ? COUNSELING

## 2025-01-24 RX ORDER — BIMATOPROST 0.3 MG/ML
SOLUTION/ DROPS OPHTHALMIC
Qty: 5 | Refills: 1 | Status: ERX | COMMUNITY
Start: 2025-01-24

## 2025-01-24 RX ADMIN — BIMATOPROST: 0.3 SOLUTION/ DROPS OPHTHALMIC at 00:00

## 2025-01-24 ASSESSMENT — LOCATION SIMPLE DESCRIPTION DERM
LOCATION SIMPLE: RIGHT CHEEK
LOCATION SIMPLE: LEFT CHEEK
LOCATION SIMPLE: RIGHT SUPERIOR EYELID

## 2025-01-24 ASSESSMENT — LOCATION DETAILED DESCRIPTION DERM
LOCATION DETAILED: LEFT CENTRAL MALAR CHEEK
LOCATION DETAILED: RIGHT LATERAL SUPERIOR EYELID
LOCATION DETAILED: RIGHT INFERIOR NASAL CHEEK
LOCATION DETAILED: RIGHT MEDIAL MALAR CHEEK
LOCATION DETAILED: RIGHT INFERIOR CENTRAL MALAR CHEEK

## 2025-01-24 ASSESSMENT — LOCATION ZONE DERM
LOCATION ZONE: FACE
LOCATION ZONE: EYELID

## 2025-01-24 NOTE — PROCEDURE: ADDITIONAL NOTES
Render Risk Assessment In Note?: no
Detail Level: Simple
Additional Notes: Flared after micro needling.  Patient advised not to use tretinoin or Revision
Additional Notes: Discussed NDYAG treatment options

## 2025-01-24 NOTE — PROCEDURE: PRESCRIPTION MEDICATION MANAGEMENT
Continue Regimen: Betamethasone cream apply PRN.  Tacrolimus ointment 0.1% apply bid to face.
Detail Level: Zone
Render In Strict Bullet Format?: No

## 2025-01-31 ENCOUNTER — RX ONLY (RX ONLY)
Age: 80
End: 2025-01-31

## 2025-01-31 RX ORDER — TACROLIMUS 1 MG/G
OINTMENT TOPICAL
Qty: 60 | Refills: 1 | Status: ERX

## 2025-01-31 RX ORDER — BETAMETHASONE DIPROPIONATE 0.5 MG/G
CREAM TOPICAL
Qty: 15 | Refills: 1 | Status: ERX | COMMUNITY
Start: 2025-01-31

## 2025-05-14 ENCOUNTER — APPOINTMENT (OUTPATIENT)
Dept: URBAN - METROPOLITAN AREA CLINIC 137 | Facility: CLINIC | Age: 80
Setting detail: DERMATOLOGY
End: 2025-05-14

## 2025-05-14 DIAGNOSIS — Z71.89 OTHER SPECIFIED COUNSELING: ICD-10-CM

## 2025-05-14 DIAGNOSIS — L82.1 OTHER SEBORRHEIC KERATOSIS: ICD-10-CM | Status: UNCHANGED

## 2025-05-14 DIAGNOSIS — L20.89 OTHER ATOPIC DERMATITIS: ICD-10-CM

## 2025-05-14 DIAGNOSIS — D49.2 NEOPLASM OF UNSPECIFIED BEHAVIOR OF BONE, SOFT TISSUE, AND SKIN: ICD-10-CM

## 2025-05-14 DIAGNOSIS — D18.0 HEMANGIOMA: ICD-10-CM | Status: UNCHANGED

## 2025-05-14 DIAGNOSIS — L57.8 OTHER SKIN CHANGES DUE TO CHRONIC EXPOSURE TO NONIONIZING RADIATION: ICD-10-CM | Status: UNCHANGED

## 2025-05-14 DIAGNOSIS — L81.4 OTHER MELANIN HYPERPIGMENTATION: ICD-10-CM

## 2025-05-14 PROBLEM — D18.01 HEMANGIOMA OF SKIN AND SUBCUTANEOUS TISSUE: Status: ACTIVE | Noted: 2025-05-14

## 2025-05-14 PROCEDURE — 99214 OFFICE O/P EST MOD 30 MIN: CPT | Mod: 25

## 2025-05-14 PROCEDURE — 40490 BIOPSY OF LIP: CPT

## 2025-05-14 PROCEDURE — ? COUNSELING

## 2025-05-14 PROCEDURE — ? SUNSCREEN RECOMMENDATIONS

## 2025-05-14 PROCEDURE — ? BIOPSY BY SHAVE METHOD

## 2025-05-14 PROCEDURE — ? PRESCRIPTION MEDICATION MANAGEMENT

## 2025-05-14 PROCEDURE — ? PHOTO-DOCUMENTATION

## 2025-05-14 ASSESSMENT — LOCATION DETAILED DESCRIPTION DERM
LOCATION DETAILED: UPPER STERNUM
LOCATION DETAILED: MIDDLE STERNUM
LOCATION DETAILED: RIGHT INFERIOR CENTRAL MALAR CHEEK
LOCATION DETAILED: SUPERIOR THORACIC SPINE
LOCATION DETAILED: INFERIOR THORACIC SPINE
LOCATION DETAILED: RIGHT INFERIOR MEDIAL FOREHEAD
LOCATION DETAILED: PERIUMBILICAL SKIN
LOCATION DETAILED: RIGHT INFERIOR VERMILION LIP
LOCATION DETAILED: RIGHT SUPERIOR UPPER BACK

## 2025-05-14 ASSESSMENT — LOCATION SIMPLE DESCRIPTION DERM
LOCATION SIMPLE: RIGHT UPPER BACK
LOCATION SIMPLE: UPPER BACK
LOCATION SIMPLE: ABDOMEN
LOCATION SIMPLE: RIGHT CHEEK
LOCATION SIMPLE: RIGHT LIP
LOCATION SIMPLE: RIGHT FOREHEAD
LOCATION SIMPLE: CHEST

## 2025-05-14 ASSESSMENT — BSA ECZEMA: % BODY COVERED IN ECZEMA: 2

## 2025-05-14 ASSESSMENT — SEVERITY ASSESSMENT 2020: SEVERITY 2020: MILD

## 2025-05-14 ASSESSMENT — LOCATION ZONE DERM
LOCATION ZONE: TRUNK
LOCATION ZONE: FACE
LOCATION ZONE: LIP

## 2025-05-14 NOTE — PROCEDURE: BIOPSY BY SHAVE METHOD
Detail Level: Detailed
Depth Of Biopsy: dermis
Was A Bandage Applied: Yes
Size Of Lesion In Cm: 0.3
X Size Of Lesion In Cm: 0
Biopsy Type: H and E
Biopsy Method: Dermablade
Anesthesia Type: 1% lidocaine with epinephrine
Anesthesia Volume In Cc: 0.5
Hemostasis: Electrocautery
Wound Care: Aquaphor
Dressing: bandage
Destruction After The Procedure: No
Type Of Destruction Used: Curettage
Curettage Text: The wound bed was treated with curettage after the biopsy was performed.
Cryotherapy Text: The wound bed was treated with cryotherapy after the biopsy was performed.
Electrodesiccation Text: The wound bed was treated with electrodesiccation after the biopsy was performed.
Electrodesiccation And Curettage Text: The wound bed was treated with electrodesiccation and curettage after the biopsy was performed.
Silver Nitrate Text: The wound bed was treated with silver nitrate after the biopsy was performed.
Lab: -[id number]
Medical Necessity Information: It is in your best interest to select a reason for this procedure from the list below. All of these items fulfill various CMS LCD requirements except the new and changing color options.
Consent: Written consent was obtained and risks were reviewed including but not limited to scarring, infection, bleeding, scabbing, incomplete removal, nerve damage and allergy to anesthesia.
Post-Care Instructions: I reviewed with the patient in detail post-care instructions. Patient is to keep the biopsy site dry overnight, and then apply THIN layer of Aquaphor once daily with bandage  and change daily until healed. Patient to call office for any problems ( pain or bleeding or swelling)
Notification Instructions: Patient will be notified of biopsy results. However, patient instructed to call the office if not contacted within 2 weeks.
Billing Type: Third-Party Bill
Information: Selecting Yes will display possible errors in your note based on the variables you have selected. This validation is only offered as a suggestion for you. PLEASE NOTE THAT THE VALIDATION TEXT WILL BE REMOVED WHEN YOU FINALIZE YOUR NOTE. IF YOU WANT TO FAX A PRELIMINARY NOTE YOU WILL NEED TO TOGGLE THIS TO 'NO' IF YOU DO NOT WANT IT IN YOUR FAXED NOTE.

## 2025-05-18 NOTE — PATIENT DISCUSSION
Denies any hematuria  CT abdomen/pelvis concerning forIndeterminate 11 mm anterior interpolar right renal lesion which could represent complex cyst or solid renal mass.   Right renal ultrasound ordered and pending   Well positioned.

## 2025-06-09 ENCOUNTER — COMPREHENSIVE EXAM (OUTPATIENT)
Age: 80
End: 2025-06-09

## 2025-06-09 DIAGNOSIS — H02.834: ICD-10-CM

## 2025-06-09 DIAGNOSIS — H04.123: ICD-10-CM

## 2025-06-09 DIAGNOSIS — H02.884: ICD-10-CM

## 2025-06-09 DIAGNOSIS — H52.4: ICD-10-CM

## 2025-06-09 DIAGNOSIS — H02.881: ICD-10-CM

## 2025-06-09 DIAGNOSIS — H02.831: ICD-10-CM

## 2025-06-09 DIAGNOSIS — H35.373: ICD-10-CM

## 2025-06-09 PROCEDURE — 92134 CPTRZ OPH DX IMG PST SGM RTA: CPT

## 2025-06-09 PROCEDURE — 92014 COMPRE OPH EXAM EST PT 1/>: CPT | Mod: 25

## 2025-06-09 PROCEDURE — 92015 DETERMINE REFRACTIVE STATE: CPT
